# Patient Record
(demographics unavailable — no encounter records)

---

## 2018-08-31 NOTE — PHYS DOC
Past Medical History


Past Medical History:  A-Fib, Hypertension, Pancreatitis


Past Surgical History:  Other


Additional Past Surgical Histo:  ruptured intestine


Alcohol Use:  None


Drug Use:  None





Adult General


Chief Complaint


Chief Complaint:  MECHANICAL FALL





HPI


HPI





Patient is a 81  year old  female complaining of mechanical fall brought in by 

ambulance this was a witnessed fall she tripped over her feet as she was 

walking down a slanted driveway. No loss of consciousness she does take Plavix 

no chest pain or shortness of breath mild headache no neck pain





Review of Systems


Review of Systems





Constitutional: Denies fever or chills []


Eyes: Denies change in visual acuity, redness, or eye pain []


HENT: Denies nasal congestion or sore throat []


Respiratory: Denies cough or shortness of breath []


Cardiovascular: No additional information not addressed in HPI []





Integument: Denies rash or skin lesions []


Neurologic: Denies headache, focal weakness or sensory changes []


Endocrine: Denies polyuria or polydipsia []





All other systems were reviewed and found to be within normal limits, except as 

documented in this note.





Current Medications


Current Medications





Current Medications








 Medications


  (Trade)  Dose


 Ordered  Sig/Parvez  Start Time


 Stop Time Status Last Admin


Dose Admin


 


 Acetaminophen


  (Tylenol)  1,000 mg  1X  ONCE  8/31/18 13:30


 8/31/18 13:31 DC 8/31/18 13:20


1,000 MG


 


 Diphtheria/


 Tetanus/Acell


 Pertussis


  (Boostrix)  0.5 ml  ONCE ONCE  8/31/18 11:15


 8/31/18 11:16 DC 8/31/18 11:36


0.5 ML


 


 Lidocaine/


 Epinephrine


  (LIDOCAINE


 1%-EPI 1:100,000


 Multi-Dose)  20 ml  1X  ONCE  8/31/18 11:30


 8/31/18 11:31 DC 8/31/18 11:36


20 ML











Allergies


Allergies





Allergies








Coded Allergies Type Severity Reaction Last Updated Verified


 


  pseudoephedrine HCl Allergy Intermediate heart 12/30/13 Yes


 


  Sulfa (Sulfonamide Antibiotics) Allergy  Hives 12/30/13 Yes











Physical Exam


Physical Exam





Constitutional: Well developed, well nourished, no acute distress, non-toxic 

appearance. []


HENT: Normocephalic, patient has a moderate to large hematoma with several sort 

of deep abrasions have some oozing but no suturable or staple ABLE lacerations.

, bilateral external ears normal, oropharynx moist, no oral exudates, nose 

normal. []


Eyes: PERRLA, EOMI, conjunctiva normal, no discharge. [] 


Neck: Normal range of motion, no tenderness, supple, no stridor. [] 


Pulmonary: Normal respiratory effort no increased work of breathing no obvious 

chest wall trauma no chest wall tenderness


Abdomen: Bowel sounds normal, soft, no tenderness, no masses, no pulsatile 

masses. [] 


Skin: Warm, dry, no erythema, no rash. [] 


Back: No tenderness, no CVA tenderness. [] 


Extremities: No tenderness, no cyanosis, no clubbing, ROM intact, no edema. [] 


Neurologic: Alert and oriented X 3, normal motor function, normal sensory 

function, no focal deficits noted. []


Psychologic: Affect normal, judgement normal, mood normal. []





Current Patient Data


Vital Signs





 Vital Signs








  Date Time  Temp Pulse Resp B/P (MAP) Pulse Ox O2 Delivery O2 Flow Rate FiO2


 


8/31/18 10:17 98.8 73 18 166/87 (113) 99 Room Air  





 98.8       











EKG


EKG


[]


Interpretation Time:


IMPRESSION:


1. Large posterior right scalp hematoma and foci of gas due to a suspected


superimposed laceration. No retained foreign body, calvarial fracture or 


acute intracranial hemorrhage is seen.


2. Subtle areas of hypodensity within the cerebral white matter, likely 


due to chronic small vessel disease.


3. Cerebral atrophy.


4. Multilevel degenerative change within the cervical spine, described 


above.


 


Electronically signed by: Lali Blackman MD (8/31/2018 12:04 PM) [place] [id number]














DICTATED and SIGNED BY:     LALI BLACKMAN MD


DATE:     08/31/18 1200





Radiology/Procedures


Radiology/Procedures


[]





Course & Med Decision Making


Course & Med Decision Making


Pertinent Labs and Imaging studies reviewed. (See chart for details)





[]81-year-old female who is on Plavix which she tells me is primary prevention 

no previous stents or stroke who is [facility] 

FA head CT is noted negative acute patient was counseled on the importance of 

coming back for any signs of intracranial hemorrhage which will include 

confusion or worsening mental status. Son was at the bedside during my 

evaluation also understands the discharge instructions. The injury that is not 

suturable 


Patient was given tetanus in the ER and observed for several hours with 

maintenance of neuro exam





Dragon Disclaimer


Dragon Disclaimer


This electronic medical record was generated, in whole or in part, using a 

voice recognition dictation system.





Departure


Departure


Impression:  


 Primary Impression:  


 Closed head injury


Disposition:  01 HOME, SELF-CARE


Condition:  IMPROVED


Referrals:  


JESUS HUFFMAN Jr, MD (PCP)











AIDEE COTTRELL MD Aug 31, 2018 12:35

## 2018-08-31 NOTE — RAD
EXAM: Head and cervical spine CT without contrast.

 

HISTORY: Fall.

 

TECHNIQUE: Computed tomographic images of the head and cervical spine were

obtained without contrast. 

*One or more of the following individualized dose reduction techniques 

were utilized for this examination:  

1. Automated exposure control.  

2. Adjustment of the mA and/or kV according to patient size.  

3. Use of iterative reconstruction technique.

 

COMPARISON: None.

 

FINDINGS: 

 

Head: There is a large posterior right scalp hematoma. There are foci of 

gas within the posterior right scalp likely due to a superimposed 

laceration. No radiodense foreign body is seen. No underlying calvarial 

fracture is seen. There is no acute or subacute intracranial hemorrhage.

 

There is cerebral volume loss with compensatory enlargement of the 

ventricles. There are areas of hypodensity within the cerebral white 

matter, likely due to chronic small vessel disease. There is evidence of 

lens surgery. There is a right earl bullosa. The mastoid air cells are 

clear. There are few small arachnoid granulations within the occipital 

bone. No suspicious calvarial lesion is seen.

 

Cervical spine: There is mild multilevel listhesis. There is degenerative 

endplate remodeling with disc space narrowing and osteophytosis primarily 

at C4-C7. There are multiple endplate Schmorl's nodes. There are few small

osseous hemangiomas. There is advanced facet arthropathy at multiple 

levels. No fracture is seen.

 

At C2-C3, there is no stenosis.

 

At C3-C4, there is a right posterior lateral predominant disc bulge and 

endplate remodeling. There is moderate right facet arthropathy. There is 

bilateral uncovertebral arthropathy. There is mild to moderate right 

foraminal stenosis.

 

At C4-C5, there is a disc bulge and endplate osteophytic ptosis. There is 

mild right and moderate left facet arthropathy. There is bilateral 

uncovertebral arthropathy. There is mild right and moderate to severe left

foraminal stenosis.

 

At C5-C6, there is a disc bulge and endplate remodeling. There is mild 

bilateral facet arthropathy. There is mild bilateral foraminal stenosis.

 

At C6-C7, there is a disc bulge and endplate remodeling. There is moderate

bilateral facet arthropathy. There is no stenosis.

 

IMPRESSION:

1. Large posterior right scalp hematoma and foci of gas due to a suspected

superimposed laceration. No retained foreign body, calvarial fracture or 

acute intracranial hemorrhage is seen.

2. Subtle areas of hypodensity within the cerebral white matter, likely 

due to chronic small vessel disease.

3. Cerebral atrophy.

4. Multilevel degenerative change within the cervical spine, described 

above.

 

Electronically signed by: Lali Blackman MD (8/31/2018 12:04 PM) [facility]